# Patient Record
Sex: FEMALE | Race: ASIAN | NOT HISPANIC OR LATINO | URBAN - METROPOLITAN AREA
[De-identification: names, ages, dates, MRNs, and addresses within clinical notes are randomized per-mention and may not be internally consistent; named-entity substitution may affect disease eponyms.]

---

## 2021-03-01 ENCOUNTER — WEB ENCOUNTER (OUTPATIENT)
Dept: URBAN - METROPOLITAN AREA CLINIC 70 | Facility: CLINIC | Age: 46
End: 2021-03-01

## 2021-03-01 ENCOUNTER — OFFICE VISIT (OUTPATIENT)
Dept: URBAN - METROPOLITAN AREA CLINIC 70 | Facility: CLINIC | Age: 46
End: 2021-03-01
Payer: COMMERCIAL

## 2021-03-01 VITALS
SYSTOLIC BLOOD PRESSURE: 107 MMHG | TEMPERATURE: 97.2 F | HEIGHT: 60 IN | BODY MASS INDEX: 22.03 KG/M2 | DIASTOLIC BLOOD PRESSURE: 68 MMHG | WEIGHT: 112.2 LBS

## 2021-03-01 DIAGNOSIS — K64.4 EXTERNAL HEMORRHOID: ICD-10-CM

## 2021-03-01 DIAGNOSIS — K59.01 CONSTIPATION BY DELAYED COLONIC TRANSIT: ICD-10-CM

## 2021-03-01 PROCEDURE — 99204 OFFICE O/P NEW MOD 45 MIN: CPT | Performed by: NURSE PRACTITIONER

## 2021-03-01 RX ORDER — LORATADINE 10 MG
1 TABLET TABLET ORAL ONCE A DAY
Status: ACTIVE | COMMUNITY

## 2021-03-01 RX ORDER — ERGOCALCIFEROL (VITAMIN D2) 62.5 MCG
AS DIRECTED CAPSULE ORAL
Status: ACTIVE | COMMUNITY

## 2021-03-01 NOTE — HPI-OTHER HISTORIES
Pt is a 45 yr old female whom presents for hemorrhoids. He has hx of colonoscopy 5/12/16 for rectal bleeding. Colonoscopy found external and internal hemorrhoids which were medium sized. 1 hemorrhoid was banded. She also has family hx significant for stomach cancer. Today she reports her GYN sent her after findings external hemorrhoids during exam. Denies rectal pain, burning, itching, or bleeding. Has hard/lumpy stools daily.  Does not use a laxative.

## 2021-03-01 NOTE — PHYSICAL EXAM RECTAL:
Kirk Larson MA present during exam- no masses palpable , large cluster of skin tags and external hemorrhoids present , normal tone , no red blood , no melena.  Dr Guerra also observed hemorrhoids and recommended referral to colo-rectal surgeon.

## 2021-05-03 ENCOUNTER — OFFICE VISIT (OUTPATIENT)
Dept: URBAN - METROPOLITAN AREA CLINIC 70 | Facility: CLINIC | Age: 46
End: 2021-05-03

## 2021-05-03 RX ORDER — LORATADINE 10 MG
1 TABLET TABLET ORAL ONCE A DAY
Status: ACTIVE | COMMUNITY

## 2021-05-03 RX ORDER — ERGOCALCIFEROL (VITAMIN D2) 62.5 MCG
AS DIRECTED CAPSULE ORAL
Status: ACTIVE | COMMUNITY

## 2021-11-19 ENCOUNTER — OFFICE VISIT (OUTPATIENT)
Dept: URBAN - METROPOLITAN AREA CLINIC 70 | Facility: CLINIC | Age: 46
End: 2021-11-19

## 2021-12-06 ENCOUNTER — OFFICE VISIT (OUTPATIENT)
Dept: URBAN - METROPOLITAN AREA CLINIC 70 | Facility: CLINIC | Age: 46
End: 2021-12-06

## 2022-01-24 ENCOUNTER — OFFICE VISIT (OUTPATIENT)
Dept: URBAN - METROPOLITAN AREA CLINIC 70 | Facility: CLINIC | Age: 47
End: 2022-01-24

## 2022-03-24 ENCOUNTER — TELEPHONE ENCOUNTER (OUTPATIENT)
Dept: URBAN - METROPOLITAN AREA CLINIC 23 | Facility: CLINIC | Age: 47
End: 2022-03-24

## 2022-11-03 ENCOUNTER — LAB OUTSIDE AN ENCOUNTER (OUTPATIENT)
Dept: URBAN - METROPOLITAN AREA CLINIC 70 | Facility: CLINIC | Age: 47
End: 2022-11-03

## 2022-11-03 ENCOUNTER — OFFICE VISIT (OUTPATIENT)
Dept: URBAN - METROPOLITAN AREA CLINIC 70 | Facility: CLINIC | Age: 47
End: 2022-11-03
Payer: COMMERCIAL

## 2022-11-03 VITALS
WEIGHT: 113.6 LBS | HEART RATE: 63 BPM | SYSTOLIC BLOOD PRESSURE: 85 MMHG | HEIGHT: 60 IN | TEMPERATURE: 98.1 F | DIASTOLIC BLOOD PRESSURE: 55 MMHG | BODY MASS INDEX: 22.3 KG/M2

## 2022-11-03 DIAGNOSIS — Z12.11 COLON CANCER SCREENING: ICD-10-CM

## 2022-11-03 DIAGNOSIS — K59.01 CONSTIPATION BY DELAYED COLONIC TRANSIT: ICD-10-CM

## 2022-11-03 DIAGNOSIS — K64.4 EXTERNAL HEMORRHOID: ICD-10-CM

## 2022-11-03 PROCEDURE — 99214 OFFICE O/P EST MOD 30 MIN: CPT | Performed by: NURSE PRACTITIONER

## 2022-11-03 RX ORDER — ERGOCALCIFEROL (VITAMIN D2) 62.5 MCG
AS DIRECTED CAPSULE ORAL
Status: ACTIVE | COMMUNITY

## 2022-11-03 RX ORDER — LORATADINE 10 MG
1 TABLET TABLET ORAL ONCE A DAY
Status: ACTIVE | COMMUNITY

## 2022-11-03 NOTE — HPI-TODAY'S VISIT:
OV 3/1/21 [place] Uriostegui NP: Pt is a 45 yr old female whom presents for hemorrhoids. He has hx of colonoscopy 5/12/16 for rectal bleeding. Colonoscopy found external and internal hemorrhoids which were medium sized. 1 hemorrhoid was banded. She also has family hx significant for stomach cancer. Today she reports her GYN sent her after findings external hemorrhoids during exam. Denies rectal pain, burning, itching, or bleeding. Has hard/lumpy stools daily.  Does not use a laxative. ----------------------------------------------------------------------------- Today 11/3/22: Patient presents today for evaluation of alternating bowel habits. She is accompanied by her  to help assist with history due to limited English by patient. She reports going days w/o a BM. States sometimes 1 week w/o having BM then when she does have a BM she has urgency and watery stool. Not taking any medication for constipation. Last colonoscopy in 2016 with findings of hemorrhoids. She did not see colorectal surgery as previously recommended last year for evaluation of external hemorroids but states her hemorrhoids are still large and cause discomfort . No Fhx of colon cancer. Denies wt loss, abdominal pain, N/V, or rectal bleeding. Of note, BP is low upon exam. She denies CP, SOB, or dizziness. Recommend pt f/u with PCP for evaluation of hypotension and if symptom develop go to ER with understanding voiced.

## 2022-12-13 ENCOUNTER — OFFICE VISIT (OUTPATIENT)
Dept: URBAN - METROPOLITAN AREA CLINIC 70 | Facility: CLINIC | Age: 47
End: 2022-12-13
Payer: COMMERCIAL

## 2022-12-13 VITALS
DIASTOLIC BLOOD PRESSURE: 56 MMHG | SYSTOLIC BLOOD PRESSURE: 88 MMHG | HEART RATE: 67 BPM | TEMPERATURE: 97.7 F | BODY MASS INDEX: 22.46 KG/M2 | HEIGHT: 60 IN | WEIGHT: 114.4 LBS

## 2022-12-13 DIAGNOSIS — K64.4 EXTERNAL HEMORRHOID: ICD-10-CM

## 2022-12-13 DIAGNOSIS — Z12.11 COLON CANCER SCREENING: ICD-10-CM

## 2022-12-13 DIAGNOSIS — K59.01 CONSTIPATION BY DELAYED COLONIC TRANSIT: ICD-10-CM

## 2022-12-13 PROBLEM — 35298007: Status: ACTIVE | Noted: 2021-03-01

## 2022-12-13 PROCEDURE — 99214 OFFICE O/P EST MOD 30 MIN: CPT | Performed by: NURSE PRACTITIONER

## 2022-12-13 RX ORDER — ERGOCALCIFEROL (VITAMIN D2) 62.5 MCG
AS DIRECTED CAPSULE ORAL
Status: ACTIVE | COMMUNITY

## 2022-12-13 RX ORDER — LORATADINE 10 MG
1 TABLET TABLET ORAL ONCE A DAY
Status: ACTIVE | COMMUNITY

## 2022-12-13 NOTE — HPI-TODAY'S VISIT:
OV 3/1/21 [place] Uriostegui NP: Pt is a 45 yr old female whom presents for hemorrhoids. He has hx of colonoscopy 5/12/16 for rectal bleeding. Colonoscopy found external and internal hemorrhoids which were medium sized. 1 hemorrhoid was banded. She also has family hx significant for stomach cancer. Today she reports her GYN sent her after findings external hemorrhoids during exam. Denies rectal pain, burning, itching, or bleeding. Has hard/lumpy stools daily.  Does not use a laxative. ----------------------------------------------------------------------------- OV 11/3/22: Patient presents today for evaluation of alternating bowel habits. She is accompanied by her  to help assist with history due to limited English by patient. She reports going days w/o a BM. States sometimes 1 week w/o having BM then when she does have a BM she has urgency and watery stool. Not taking any medication for constipation. Last colonoscopy in 2016 with findings of hemorrhoids. She did not see colorectal surgery as previously recommended last year for evaluation of external hemorroids but states her hemorrhoids are still large and cause discomfort . No Fhx of colon cancer. Denies wt loss, abdominal pain, N/V, or rectal bleeding. Of note, BP is low upon exam. She denies CP, SOB, or dizziness. Recommend pt f/u with PCP for evaluation of hypotension and if symptom develop go to ER with understanding voiced. ---------------------------------------------------------------------------- Today 12/13/22: Patient presents today for follow up. Abdominal x-ray 11/7/22 was unremarkable. She did not start on daily miralax as previously recommended but states with drinking more water her constipation has partially improved. Is still pending appt with Dr. Prather for hemorrohoid surgery as previous referred. No new GI symptoms or complaints today.

## 2022-12-28 ENCOUNTER — TELEPHONE ENCOUNTER (OUTPATIENT)
Dept: URBAN - METROPOLITAN AREA CLINIC 70 | Facility: CLINIC | Age: 47
End: 2022-12-28

## 2023-06-26 ENCOUNTER — LAB OUTSIDE AN ENCOUNTER (OUTPATIENT)
Dept: URBAN - METROPOLITAN AREA CLINIC 70 | Facility: CLINIC | Age: 48
End: 2023-06-26

## 2023-06-26 ENCOUNTER — OFFICE VISIT (OUTPATIENT)
Dept: URBAN - METROPOLITAN AREA CLINIC 70 | Facility: CLINIC | Age: 48
End: 2023-06-26
Payer: COMMERCIAL

## 2023-06-26 VITALS
HEIGHT: 60 IN | BODY MASS INDEX: 22.34 KG/M2 | DIASTOLIC BLOOD PRESSURE: 66 MMHG | TEMPERATURE: 98 F | HEART RATE: 64 BPM | WEIGHT: 113.8 LBS | SYSTOLIC BLOOD PRESSURE: 105 MMHG

## 2023-06-26 DIAGNOSIS — K59.01 CONSTIPATION BY DELAYED COLONIC TRANSIT: ICD-10-CM

## 2023-06-26 DIAGNOSIS — Z12.11 COLON CANCER SCREENING: ICD-10-CM

## 2023-06-26 DIAGNOSIS — K64.4 EXTERNAL HEMORRHOID: ICD-10-CM

## 2023-06-26 PROCEDURE — 99214 OFFICE O/P EST MOD 30 MIN: CPT | Performed by: NURSE PRACTITIONER

## 2023-06-26 RX ORDER — LORATADINE 10 MG
1 TABLET TABLET ORAL ONCE A DAY
Status: ACTIVE | COMMUNITY

## 2023-06-26 RX ORDER — LORATADINE 10 MG
1 PACKET MIXED WITH 8 OUNCES OF FLUID TABLET,DISINTEGRATING ORAL ONCE A DAY
Status: ON HOLD | COMMUNITY

## 2023-06-26 RX ORDER — LACTULOSE 10 G/15ML
30ML SOLUTION ORAL TWICE A DAY
Qty: 1800 ML | Refills: 5 | OUTPATIENT
Start: 2023-06-26 | End: 2023-12-22

## 2023-06-26 RX ORDER — ERGOCALCIFEROL (VITAMIN D2) 62.5 MCG
AS DIRECTED CAPSULE ORAL
Status: ON HOLD | COMMUNITY

## 2023-06-26 RX ORDER — FAMOTIDINE 20 MG/1
TABLET, FILM COATED ORAL
Qty: 60 TABLET | Status: ACTIVE | COMMUNITY

## 2023-06-26 NOTE — HPI-TODAY'S VISIT:
OV 3/1/21 [place] Uriostegui NP: Pt is a 45 yr old female whom presents for hemorrhoids. He has hx of colonoscopy 5/12/16 for rectal bleeding. Colonoscopy found external and internal hemorrhoids which were medium sized. 1 hemorrhoid was banded. She also has family hx significant for stomach cancer. Today she reports her GYN sent her after findings external hemorrhoids during exam. Denies rectal pain, burning, itching, or bleeding. Has hard/lumpy stools daily.  Does not use a laxative. ----------------------------------------------------------------------------- OV 11/3/22: Patient presents today for evaluation of alternating bowel habits. She is accompanied by her  to help assist with history due to limited English by patient. She reports going days w/o a BM. States sometimes 1 week w/o having BM then when she does have a BM she has urgency and watery stool. Not taking any medication for constipation. Last colonoscopy in 2016 with findings of hemorrhoids. She did not see colorectal surgery as previously recommended last year for evaluation of external hemorroids but states her hemorrhoids are still large and cause discomfort . No Fhx of colon cancer. Denies wt loss, abdominal pain, N/V, or rectal bleeding. Of note, BP is low upon exam. She denies CP, SOB, or dizziness. Recommend pt f/u with PCP for evaluation of hypotension and if symptom develop go to ER with understanding voiced. ---------------------------------------------------------------------------- OV 12/13/22: Patient presents today for follow up. Abdominal x-ray 11/7/22 was unremarkable. She did not start on daily miralax as previously recommended but states with drinking more water her constipation has partially improved. Is still pending appt with Dr. Prather for hemorrohoid surgery as previous referred. No new GI symptoms or complaints today. ------------------------------------------------------------------------ Today 6/26/23: Patient presents today accompanied by . She reports continued constipation with no BM for several days then will have BM followed by multiple loose stools. Had only slight improvement with miralax but stopped taking medication. Has associated bloating. Did no f/u with colorectal for external hemorrhoids.  request a closer surgeon. No new GI complaints. Denies abdomina pain, wt loss, N/V, or rectal bleeding.

## 2023-07-13 ENCOUNTER — OFFICE VISIT (OUTPATIENT)
Dept: URBAN - METROPOLITAN AREA SURGERY CENTER 24 | Facility: SURGERY CENTER | Age: 48
End: 2023-07-13
Payer: COMMERCIAL

## 2023-07-13 DIAGNOSIS — Z12.11 COLON CANCER SCREENING: ICD-10-CM

## 2023-07-13 PROCEDURE — G8907 PT DOC NO EVENTS ON DISCHARG: HCPCS | Performed by: INTERNAL MEDICINE

## 2023-07-13 PROCEDURE — G0121 COLON CA SCRN NOT HI RSK IND: HCPCS | Performed by: INTERNAL MEDICINE

## 2023-07-13 RX ORDER — ERGOCALCIFEROL (VITAMIN D2) 62.5 MCG
AS DIRECTED CAPSULE ORAL
Status: ON HOLD | COMMUNITY

## 2023-07-13 RX ORDER — LORATADINE 10 MG
1 TABLET TABLET ORAL ONCE A DAY
Status: ACTIVE | COMMUNITY

## 2023-07-13 RX ORDER — LACTULOSE 10 G/15ML
30ML SOLUTION ORAL TWICE A DAY
Qty: 1800 ML | Refills: 5 | Status: ACTIVE | COMMUNITY
Start: 2023-06-26 | End: 2023-12-22

## 2023-07-13 RX ORDER — LORATADINE 10 MG
1 PACKET MIXED WITH 8 OUNCES OF FLUID TABLET,DISINTEGRATING ORAL ONCE A DAY
Status: ON HOLD | COMMUNITY

## 2023-07-13 RX ORDER — FAMOTIDINE 20 MG/1
TABLET, FILM COATED ORAL
Qty: 60 TABLET | Status: ACTIVE | COMMUNITY

## 2023-08-14 ENCOUNTER — DASHBOARD ENCOUNTERS (OUTPATIENT)
Age: 48
End: 2023-08-14

## 2023-08-14 ENCOUNTER — OFFICE VISIT (OUTPATIENT)
Dept: URBAN - METROPOLITAN AREA CLINIC 70 | Facility: CLINIC | Age: 48
End: 2023-08-14
Payer: COMMERCIAL

## 2023-08-14 VITALS
HEIGHT: 60 IN | DIASTOLIC BLOOD PRESSURE: 67 MMHG | BODY MASS INDEX: 22.26 KG/M2 | WEIGHT: 113.4 LBS | HEART RATE: 70 BPM | TEMPERATURE: 98.2 F | SYSTOLIC BLOOD PRESSURE: 103 MMHG

## 2023-08-14 DIAGNOSIS — K59.01 CONSTIPATION BY DELAYED COLONIC TRANSIT: ICD-10-CM

## 2023-08-14 DIAGNOSIS — Z12.11 COLON CANCER SCREENING: ICD-10-CM

## 2023-08-14 DIAGNOSIS — K64.4 EXTERNAL HEMORRHOID: ICD-10-CM

## 2023-08-14 PROCEDURE — 99213 OFFICE O/P EST LOW 20 MIN: CPT | Performed by: NURSE PRACTITIONER

## 2023-08-14 RX ORDER — LORATADINE 10 MG
1 TABLET TABLET ORAL ONCE A DAY
Status: ON HOLD | COMMUNITY

## 2023-08-14 RX ORDER — ERGOCALCIFEROL (VITAMIN D2) 62.5 MCG
AS DIRECTED CAPSULE ORAL
Status: ON HOLD | COMMUNITY

## 2023-08-14 RX ORDER — LACTULOSE 10 G/15ML
30ML SOLUTION ORAL TWICE A DAY
OUTPATIENT
Start: 2023-06-26

## 2023-08-14 RX ORDER — LORATADINE 10 MG
1 PACKET MIXED WITH 8 OUNCES OF FLUID TABLET,DISINTEGRATING ORAL ONCE A DAY
Status: ON HOLD | COMMUNITY

## 2023-08-14 RX ORDER — LACTULOSE 10 G/15ML
30ML SOLUTION ORAL TWICE A DAY
Qty: 1800 ML | Refills: 5 | Status: ACTIVE | COMMUNITY
Start: 2023-06-26 | End: 2023-12-22

## 2023-08-14 RX ORDER — FAMOTIDINE 20 MG/1
TABLET, FILM COATED ORAL
Qty: 60 TABLET | Status: ACTIVE | COMMUNITY

## 2023-08-14 NOTE — HPI-TODAY'S VISIT:
OV 3/1/21 [place] Uriostegui NP: Pt is a 45 yr old female whom presents for hemorrhoids. He has hx of colonoscopy 5/12/16 for rectal bleeding. Colonoscopy found external and internal hemorrhoids which were medium sized. 1 hemorrhoid was banded. She also has family hx significant for stomach cancer. Today she reports her GYN sent her after findings external hemorrhoids during exam. Denies rectal pain, burning, itching, or bleeding. Has hard/lumpy stools daily.  Does not use a laxative. ----------------------------------------------------------------------------- OV 11/3/22: Patient presents today for evaluation of alternating bowel habits. She is accompanied by her  to help assist with history due to limited English by patient. She reports going days w/o a BM. States sometimes 1 week w/o having BM then when she does have a BM she has urgency and watery stool. Not taking any medication for constipation. Last colonoscopy in 2016 with findings of hemorrhoids. She did not see colorectal surgery as previously recommended last year for evaluation of external hemorroids but states her hemorrhoids are still large and cause discomfort . No Fhx of colon cancer. Denies wt loss, abdominal pain, N/V, or rectal bleeding. Of note, BP is low upon exam. She denies CP, SOB, or dizziness. Recommend pt f/u with PCP for evaluation of hypotension and if symptom develop go to ER with understanding voiced. ---------------------------------------------------------------------------- OV 12/13/22: Patient presents today for follow up. Abdominal x-ray 11/7/22 was unremarkable. She did not start on daily miralax as previously recommended but states with drinking more water her constipation has partially improved. Is still pending appt with Dr. Prather for hemorrohoid surgery as previous referred. No new GI symptoms or complaints today. ------------------------------------------------------------------------ OV 6/26/23: Patient presents today accompanied by . She reports continued constipation with no BM for several days then will have BM followed by multiple loose stools. Had only slight improvement with miralax but stopped taking medication. Has associated bloating. Did no f/u with colorectal for external hemorrhoids.  request a closer surgeon. No new GI complaints. Denies abdomina pain, wt loss, N/V, or rectal bleeding. -------------------------------------------------------------------------- Today 8/14/23: Patient presents today for follow up. Underwent a colonoscopy 7/13/23 with findings of external hemorrhoids, otherwise normal with recall in 10 years. Results discussed with patient. She reports doing well with constipaton now resolved with taking lactulose once daily. Has pending hemorrhoid surgery this friday with Dr. Thomas. No current or new GI complaints.

## 2024-06-24 ENCOUNTER — TELEPHONE ENCOUNTER (OUTPATIENT)
Dept: URBAN - METROPOLITAN AREA CLINIC 70 | Facility: CLINIC | Age: 49
End: 2024-06-24

## 2024-06-24 RX ORDER — LACTULOSE 10 G/15ML
30ML SOLUTION ORAL TWICE A DAY
Qty: 1800 ML | Refills: 11
Start: 2023-06-26 | End: 2025-06-19

## 2024-08-07 ENCOUNTER — OFFICE VISIT (OUTPATIENT)
Dept: URBAN - METROPOLITAN AREA CLINIC 70 | Facility: CLINIC | Age: 49
End: 2024-08-07
Payer: SELF-PAY

## 2024-08-07 VITALS
TEMPERATURE: 97.7 F | HEIGHT: 60 IN | SYSTOLIC BLOOD PRESSURE: 109 MMHG | WEIGHT: 133.8 LBS | DIASTOLIC BLOOD PRESSURE: 66 MMHG | BODY MASS INDEX: 26.27 KG/M2 | HEART RATE: 59 BPM

## 2024-08-07 DIAGNOSIS — K59.01 CONSTIPATION BY DELAYED COLONIC TRANSIT: ICD-10-CM

## 2024-08-07 DIAGNOSIS — Z12.11 COLON CANCER SCREENING: ICD-10-CM

## 2024-08-07 DIAGNOSIS — K64.4 EXTERNAL HEMORRHOID: ICD-10-CM

## 2024-08-07 PROCEDURE — 99214 OFFICE O/P EST MOD 30 MIN: CPT | Performed by: NURSE PRACTITIONER

## 2024-08-07 RX ORDER — LORATADINE 10 MG
1 PACKET MIXED WITH 8 OUNCES OF FLUID TABLET,DISINTEGRATING ORAL ONCE A DAY
Status: ON HOLD | COMMUNITY

## 2024-08-07 RX ORDER — ERGOCALCIFEROL (VITAMIN D2) 62.5 MCG
AS DIRECTED CAPSULE ORAL
Status: ON HOLD | COMMUNITY

## 2024-08-07 RX ORDER — ATORVASTATIN CALCIUM 20 MG/1
1 TABLET TABLET, FILM COATED ORAL ONCE A DAY
Status: ACTIVE | COMMUNITY

## 2024-08-07 RX ORDER — LACTULOSE 10 G/15ML
30ML SOLUTION ORAL ONCE A DAY
Qty: 2700 ML | Refills: 3

## 2024-08-07 RX ORDER — LACTULOSE 10 G/15ML
30ML SOLUTION ORAL TWICE A DAY
Qty: 1800 ML | Refills: 11 | Status: ACTIVE | COMMUNITY
Start: 2023-06-26 | End: 2025-06-19

## 2024-08-07 RX ORDER — LEVOTHYROXINE SODIUM 75 UG/1
1 TABLET IN THE MORNING ON AN EMPTY STOMACH TABLET ORAL ONCE A DAY
Status: ACTIVE | COMMUNITY

## 2024-08-07 RX ORDER — FAMOTIDINE 20 MG/1
TABLET, FILM COATED ORAL
Qty: 60 TABLET | Status: ACTIVE | COMMUNITY

## 2024-08-07 RX ORDER — LORATADINE 10 MG
1 TABLET TABLET ORAL ONCE A DAY
Status: ACTIVE | COMMUNITY

## 2024-08-07 NOTE — HPI-TODAY'S VISIT:
OV 3/1/21 [place] Uriostegui NP: Pt is a 45 yr old female whom presents for hemorrhoids. He has hx of colonoscopy 5/12/16 for rectal bleeding. Colonoscopy found external and internal hemorrhoids which were medium sized. 1 hemorrhoid was banded. She also has family hx significant for stomach cancer. Today she reports her GYN sent her after findings external hemorrhoids during exam. Denies rectal pain, burning, itching, or bleeding. Has hard/lumpy stools daily.  Does not use a laxative. ----------------------------------------------------------------------------- OV 11/3/22: Patient presents today for evaluation of alternating bowel habits. She is accompanied by her  to help assist with history due to limited English by patient. She reports going days w/o a BM. States sometimes 1 week w/o having BM then when she does have a BM she has urgency and watery stool. Not taking any medication for constipation. Last colonoscopy in 2016 with findings of hemorrhoids. She did not see colorectal surgery as previously recommended last year for evaluation of external hemorroids but states her hemorrhoids are still large and cause discomfort . No Fhx of colon cancer. Denies wt loss, abdominal pain, N/V, or rectal bleeding. Of note, BP is low upon exam. She denies CP, SOB, or dizziness. Recommend pt f/u with PCP for evaluation of hypotension and if symptom develop go to ER with understanding voiced. ---------------------------------------------------------------------------- OV 12/13/22: Patient presents today for follow up. Abdominal x-ray 11/7/22 was unremarkable. She did not start on daily miralax as previously recommended but states with drinking more water her constipation has partially improved. Is still pending appt with Dr. Prather for hemorrohoid surgery as previous referred. No new GI symptoms or complaints today. ------------------------------------------------------------------------ OV 6/26/23: Patient presents today accompanied by . She reports continued constipation with no BM for several days then will have BM followed by multiple loose stools. Had only slight improvement with miralax but stopped taking medication. Has associated bloating. Did no f/u with colorectal for external hemorrhoids.  request a closer surgeon. No new GI complaints. Denies abdomina pain, wt loss, N/V, or rectal bleeding. -------------------------------------------------------------------------- OV 8/14/23: Patient presents today for follow up. Underwent a colonoscopy 7/13/23 with findings of external hemorrhoids, otherwise normal with recall in 10 years. Results discussed with patient. She reports doing well with constipaton now resolved with taking lactulose once daily. Has pending hemorrhoid surgery this friday with Dr. Thomas. No current or new GI complaints. ----------------------------------------------- Today 8/7/24: Patient presents today for annual visit. She is s/p hemorrhoid surgery with resoluation of symptoms. Is compliant with taking lactulose daily with constipation resolved/well controlled. No new or current GI complaints.